# Patient Record
Sex: FEMALE | Race: WHITE | URBAN - METROPOLITAN AREA
[De-identification: names, ages, dates, MRNs, and addresses within clinical notes are randomized per-mention and may not be internally consistent; named-entity substitution may affect disease eponyms.]

---

## 2022-11-15 ENCOUNTER — OFFICE VISIT (OUTPATIENT)
Dept: URBAN - METROPOLITAN AREA CLINIC 30 | Facility: CLINIC | Age: 77
End: 2022-11-15
Payer: MEDICARE

## 2022-11-15 DIAGNOSIS — H53.2 DIPLOPIA: Primary | ICD-10-CM

## 2022-11-15 DIAGNOSIS — Z96.1 PRESENCE OF INTRAOCULAR LENS: ICD-10-CM

## 2022-11-15 DIAGNOSIS — H43.813 VITREOUS DEGENERATION, BILATERAL: ICD-10-CM

## 2022-11-15 PROCEDURE — 92004 COMPRE OPH EXAM NEW PT 1/>: CPT

## 2022-11-15 PROCEDURE — 92134 CPTRZ OPH DX IMG PST SGM RTA: CPT

## 2022-11-15 ASSESSMENT — KERATOMETRY
OD: 45.13
OS: 45.38

## 2022-11-15 ASSESSMENT — VISUAL ACUITY
OS: 20/20
OD: 20/25

## 2022-11-15 ASSESSMENT — INTRAOCULAR PRESSURE
OS: 14
OD: 13

## 2022-11-15 NOTE — IMPRESSION/PLAN
Impression: Diplopia: H53.2. Plan: Pt reports last Monday she started seeing binocular, horizontal + vertical double vision around 1pm. Lasted around 20 minutes and resolved. Has happened daily since then (not yet today). NP had increased her medication dosage of Synthroid from 75mg to 88mg. Saw Dr. Kranthi Carvalho on Monday (yesterday) and she confirmed it was not related to the medication, recommended patient see eye doctor. Exam today within normal limits, no diplopia on examination, EOM's full, patient ortho at distance and near. No cause of diplopia identified. No history of eye turn/lazy eye as a child. Consider decompensated phoria although unlikely given ortho on exam today. Ed pt on S/S of TIA and educated to go to ER immediately if signs occur. Given patient h/o breast cancer, recommend that PCP consider MRI brain & orbits to rule out other causes of intermittent double vision. Pt had carotid ultrasound last week, pending results. PLAN:
Continue follow up with PCP. RTC if symptoms worsen or change or become constant.  RTC 6 months

## 2023-06-12 ENCOUNTER — TESTING ONLY (OUTPATIENT)
Dept: URBAN - METROPOLITAN AREA CLINIC 30 | Facility: CLINIC | Age: 78
End: 2023-06-12
Payer: MEDICARE

## 2023-06-12 DIAGNOSIS — H53.2 DIPLOPIA: ICD-10-CM

## 2023-06-12 DIAGNOSIS — H53.40 VISUAL FIELD DEFECT: Primary | ICD-10-CM

## 2023-06-12 PROCEDURE — 92083 EXTENDED VISUAL FIELD XM: CPT

## 2023-06-12 NOTE — IMPRESSION/PLAN
Impression: Visual field defect: H53.40. Plan: Here for HVF visit only per Dr. Montrell Quiñones. 30-2 HVF 6/12/2023:
OD unreliable dt high FN (45%), ring scotoma + inferior nasal depression. OS decent reliability (FN 30%), total inferior depression. Results faxed to Dr. Monrtell Quiñones office.

## 2024-10-30 ENCOUNTER — OFFICE VISIT (OUTPATIENT)
Dept: URBAN - METROPOLITAN AREA CLINIC 30 | Facility: CLINIC | Age: 79
End: 2024-10-30
Payer: COMMERCIAL

## 2024-10-30 DIAGNOSIS — H53.40 UNSPECIFIED VISUAL FIELD DEFECTS: Primary | ICD-10-CM

## 2024-10-30 DIAGNOSIS — H43.813 VITREOUS DEGENERATION, BILATERAL: ICD-10-CM

## 2024-10-30 DIAGNOSIS — H53.2 DIPLOPIA: ICD-10-CM

## 2024-10-30 PROCEDURE — 99204 OFFICE O/P NEW MOD 45 MIN: CPT | Performed by: OPHTHALMOLOGY

## 2024-10-30 PROCEDURE — 92133 CPTRZD OPH DX IMG PST SGM ON: CPT | Performed by: OPHTHALMOLOGY

## 2024-10-30 PROCEDURE — 92083 EXTENDED VISUAL FIELD XM: CPT | Performed by: OPHTHALMOLOGY

## 2024-10-30 ASSESSMENT — VISUAL ACUITY
OD: 20/40
OS: 20/40

## 2024-10-30 ASSESSMENT — INTRAOCULAR PRESSURE
OD: 12
OS: 12

## 2024-10-30 ASSESSMENT — KERATOMETRY
OD: 44.77
OS: 45.03

## 2025-05-05 ENCOUNTER — OFFICE VISIT (OUTPATIENT)
Dept: URBAN - METROPOLITAN AREA CLINIC 30 | Facility: CLINIC | Age: 80
End: 2025-05-05
Payer: MEDICARE

## 2025-05-05 DIAGNOSIS — H53.40 VISUAL FIELD DEFECT: Primary | ICD-10-CM

## 2025-05-05 DIAGNOSIS — M31.6 GIANT CELL ARTERITIS: ICD-10-CM

## 2025-05-05 DIAGNOSIS — H26.493 OTHER SECONDARY CATARACT, BILATERAL: ICD-10-CM

## 2025-05-05 PROCEDURE — 99214 OFFICE O/P EST MOD 30 MIN: CPT | Performed by: OPHTHALMOLOGY

## 2025-05-05 PROCEDURE — 92083 EXTENDED VISUAL FIELD XM: CPT | Performed by: OPHTHALMOLOGY

## 2025-05-05 ASSESSMENT — INTRAOCULAR PRESSURE
OS: 15
OD: 15